# Patient Record
(demographics unavailable — no encounter records)

---

## 2025-02-14 NOTE — REASON FOR VISIT
[Initial Consultation] : an initial consultation for [Patient] : patient [Mother] : mother [FreeTextEntry3] : family hx

## 2025-02-14 NOTE — HISTORY OF PRESENT ILLNESS
[FreeTextEntry1] : I had the pleasure of evaluating Juan in our pediatric cardiology clinic today on February 14, 2025.  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  Juan plays football at a competitive level and denies any symptoms of chest pain, shortness of breath, dizziness, palpitation or syncope.  Importantly, Juan's older brother has congenital heart defect who is status post total arterial switch operation and followed by me.  Past medical history is unremarkable.

## 2025-02-14 NOTE — CLEARANCE
[May participate in the entire physical education program without restriction, including all varsity] : ~He/She~ may participate in the entire physical education program without restriction, including all varsity competitive sports.

## 2025-02-14 NOTE — CONSULT LETTER
[Today's Date] : [unfilled] [Name] : Name: [unfilled] [] : : ~~ [Today's Date:] : [unfilled] [Dear  ___:] : Dear Dr. [unfilled]: [Consult - Single Provider] : Thank you very much for allowing me to participate in the care of this patient. If you have any questions, please do not hesitate to contact me. [Sincerely,] : Sincerely, [FreeTextEntry4] : Dr. Dayo Pederson MD [FreeTextEntry5] : [address] [facility], [place] [address] [FreeTextEntry6] : [phone] [de-identified] : Carlo Thakkar MD Congenital interventional Cardiologist [facility] , [facility] [employer] Telephone: [phone] [phone]

## 2025-02-14 NOTE — CARDIOLOGY SUMMARY
[Today's Date] : [unfilled] [FreeTextEntry1] : Normal sinus rhythm with normal intervals. [FreeTextEntry2] : Anatomically normal heart and good function.  No evidence of cardiomyopathy or coronary artery anomalies.

## 2025-02-14 NOTE — DISCUSSION/SUMMARY
[FreeTextEntry1] : In summary, I am pleased to inform that Juan is a healthy 16-year-old who has a normal cardiac evaluation today including an exam, EKG and an echocardiogram.  Therefore, from a cardiovascular standpoint he has no restriction with regards to physical activity and may participate in sports ad nia.  No further cardiac workup or follow-up is required unless other cardiac related issues arise.